# Patient Record
Sex: FEMALE | Race: WHITE | Employment: FULL TIME | URBAN - METROPOLITAN AREA
[De-identification: names, ages, dates, MRNs, and addresses within clinical notes are randomized per-mention and may not be internally consistent; named-entity substitution may affect disease eponyms.]

---

## 2017-01-28 RX ORDER — SERTRALINE HYDROCHLORIDE 25 MG/1
TABLET, FILM COATED ORAL
Qty: 30 TAB | Refills: 2 | Status: SHIPPED | OUTPATIENT
Start: 2017-01-28 | End: 2022-05-09

## 2017-03-13 ENCOUNTER — OFFICE VISIT (OUTPATIENT)
Dept: MIDWIFE SERVICES | Age: 47
End: 2017-03-13

## 2017-03-13 VITALS
HEART RATE: 64 BPM | TEMPERATURE: 97.4 F | BODY MASS INDEX: 21.33 KG/M2 | SYSTOLIC BLOOD PRESSURE: 103 MMHG | HEIGHT: 65 IN | DIASTOLIC BLOOD PRESSURE: 62 MMHG | WEIGHT: 128 LBS

## 2017-03-13 DIAGNOSIS — N89.8 VAGINAL DISCHARGE: Primary | ICD-10-CM

## 2017-03-13 LAB
BILIRUB UR QL STRIP: NEGATIVE
GLUCOSE UR-MCNC: NEGATIVE MG/DL
KETONES P FAST UR STRIP-MCNC: NEGATIVE MG/DL
PH UR STRIP: 7 [PH] (ref 4.6–8)
PROT UR QL STRIP: NEGATIVE MG/DL
SP GR UR STRIP: 1.02 (ref 1–1.03)
UA UROBILINOGEN AMB POC: NORMAL (ref 0.2–1)
URINALYSIS CLARITY POC: CLEAR
URINALYSIS COLOR POC: YELLOW
URINE BLOOD POC: NORMAL
URINE LEUKOCYTES POC: NORMAL
URINE NITRITES POC: NEGATIVE

## 2017-03-13 RX ORDER — NITROFURANTOIN 25; 75 MG/1; MG/1
CAPSULE ORAL
Refills: 0 | COMMUNITY
Start: 2016-12-09 | End: 2017-03-13 | Stop reason: ALTCHOICE

## 2017-03-13 RX ORDER — OSELTAMIVIR PHOSPHATE 75 MG/1
CAPSULE ORAL
Refills: 0 | COMMUNITY
Start: 2017-02-06 | End: 2017-03-13 | Stop reason: ALTCHOICE

## 2017-03-13 NOTE — PROGRESS NOTES
Chief Complaint   Patient presents with    Well Woman     Possible bacterial infection     Visit Vitals    /62    Pulse 64    Temp 97.4 °F (36.3 °C) (Oral)    Ht 5' 5\" (1.651 m)    Wt 128 lb (58.1 kg)    Breastfeeding No    BMI 21.3 kg/m2     1. Have you been to the ER, urgent care clinic since your last visit? Hospitalized since your last visit? No    2. Have you seen or consulted any other health care providers outside of the [address] [place] [address] since your last visit? Include any pap smears or colon screening. No     Patient here for possible bacterial infection. Verbal order per Dr Edenilson Shields for Urine dip and Nuswab.

## 2017-03-13 NOTE — PROGRESS NOTES
GYN Visit Note          Chief Complaint: vaginal discharge    HPI:    Gilbert Michaud  51 y.o. WF     LMP:  presents after visit with PCP who treated her for UTI with Macrobid (medication only record in chart) now had thick yellow vaginal discharge with odor. She denies fever/chill/nausea/vomiting/cough or painful urination. Review of Systems: -    General ROS: negative for - chills, fever or malaise  Gastrointestinal ROS: no abdominal pain, change in bowel habits, or black or bloody stools  Genito-Urinary ROS: no dysuria, trouble voiding, or hematuria    OBJECTIVE:  Blood pressure 103/62, pulse 64, temperature 97.4 °F (36.3 °C), temperature source Oral, height 5' 5\" (1.651 m), weight 128 lb (58.1 kg), not currently breastfeeding. General appearance - alert, well appearing, and in no distress  Mental status - alert, oriented to person, place, and time  Abdomen - soft, nontender, nondistended, no masses or organomegaly  Pelvic - VULVA: normal appearing vulva with no masses, tenderness or lesions, VAGINA: normal appearing vagina with normal color and discharge, no lesions, CERVIX: cervical discharge present - creamy at the cervix, Nuswab obtained and sent for identification. Chaperoned by Jem Martinez RN     ASSESSMENT / PLAN:    Tahir Mathias was seen today for well woman. Diagnoses and all orders for this visit:    Vaginal discharge  -     AMB POC URINALYSIS DIP STICK MANUAL W/O MICRO  -     NUSWAB VAGINITIS      Follow-up Disposition:  Return if symptoms worsen or fail to improve. Tray Su MD, [address] [place] [address], Retired    Liliane Prather Professor, [address] Medicine

## 2017-03-16 LAB
A VAGINAE DNA VAG QL NAA+PROBE: NORMAL SCORE
BVAB2 DNA VAG QL NAA+PROBE: NORMAL SCORE
C ALBICANS DNA VAG QL NAA+PROBE: NEGATIVE
C GLABRATA DNA VAG QL NAA+PROBE: NEGATIVE
MEGA1 DNA VAG QL NAA+PROBE: NORMAL SCORE
T VAGINALIS RRNA SPEC QL NAA+PROBE: NEGATIVE

## 2017-09-14 ENCOUNTER — OFFICE VISIT (OUTPATIENT)
Dept: MIDWIFE SERVICES | Age: 47
End: 2017-09-14

## 2017-09-14 VITALS
HEIGHT: 65 IN | DIASTOLIC BLOOD PRESSURE: 60 MMHG | WEIGHT: 125 LBS | BODY MASS INDEX: 20.83 KG/M2 | SYSTOLIC BLOOD PRESSURE: 94 MMHG | HEART RATE: 57 BPM

## 2017-09-14 DIAGNOSIS — Z01.419 WELL WOMAN EXAM: Primary | ICD-10-CM

## 2017-09-14 DIAGNOSIS — G43.109 MIGRAINE WITH AURA AND WITHOUT STATUS MIGRAINOSUS, NOT INTRACTABLE: ICD-10-CM

## 2017-09-14 RX ORDER — ALPRAZOLAM 0.25 MG/1
TABLET ORAL
COMMUNITY

## 2017-09-14 RX ORDER — CETIRIZINE HCL 10 MG
10 TABLET ORAL
COMMUNITY
End: 2022-05-09

## 2017-09-14 NOTE — PROGRESS NOTES
I was present with the resident during the interview & examination of the patient. I personally interviewed the patient and repeated the critical or key portions of the exam.  I confirmed/amended the history, exam, assessment and plan as noted. Diagnoses and all orders for this visit:    1. Well woman exam    2. Migraine with aura and without status migrainosus, not intractable      Follow-up Disposition:  Return in about 1 year (around 9/14/2018) for Annual exam - offered CNM for continued care and she indicated she new Lucy Monsalve and would follow up with her. Tray Avery MD, [address] [place] [address], Retired    Marisel Arteaga Professor, [address]

## 2017-09-14 NOTE — PROGRESS NOTES
Well Woman Check Up       Subjective:     49 y.o. CaucasianF  G3 [id number] LMP: 9/1/2017 for routine annual exam    LMP: 9/1/17. GYN: Has been having heavier periods than before-possibly starting menopause. OB: Pt considering having another child either through IVF or surrogate. Has not made a decision yet. Her previous pregnancy was via IVF and pregnancy history complicated by stillbirth, pre-eclampsia and iso-immunization. Social History: sexually active with , not using contraception     Pertinent past medical history: no history of HTN, DVT, CAD, DM, liver disease, or smoking. She reports having migraines with auras for several years now-occur once a month. PCP aware but pt states they were never bad enough to want her to start medication. PAP History:   No history of abnormal pap smears. Last pap from 2015 negative. Colonoscopy: To be done at age 47     Mammogram:  Last mammogram from 2015 normal. Has had a total of 5 normal mammograms. Will repeat mammogram at age 47. DEXA: Not indicated at this time. 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  cetirizine (ZYRTEC) 10 mg tablet Take 10 mg by mouth.       sertraline (ZOLOFT) 25 mg tablet TAKE 1 TABLET BY MOUTH DAILY. 30 Tab 2    valACYclovir (VALTREX) 500 mg tablet Take 1 Tab by mouth daily. 30 Tab 0    loratadine (CLARITIN) 10 mg tablet Take 10 mg by mouth. Allergies   Allergen Reactions    Flagyl [Metronidazole] Hives     Past Medical History:   Diagnosis Date    AR (allergic rhinitis)     Genital herpes     x- has, not patient    Infertility, female     unexplained    Preeclampsia 7/2015    Severe; with fetal demise at 33 weeks. Past Surgical History:   Procedure Laterality Date    HX APPENDECTOMY  1/2010    HX OTHER SURGICAL  1990    Exp Lap     Family History   Problem Relation Age of Onset    Osteoporosis Mother     Heart Disease Mother     Cancer Mother     Diabetes Paternal Aunt     High Cholesterol Sister     Hypertension Sister     Uterine Cancer Neg Hx     Colon Cancer Neg Hx     Ovarian Cancer Neg Hx      Social History   Substance Use Topics    Smoking status: Never Smoker    Smokeless tobacco: Never Used    Alcohol use 0.0 oz/week      Comment: two drinks a week per patient        ROS:  Feeling well. No dyspnea or chest pain on exertion. No abdominal pain, change in bowel habits, black or bloody stools. No urinary tract symptoms. GYN ROS: normal menses, no abnormal bleeding, pelvic pain or discharge, no breast pain or new or enlarging lumps on self exam. No neurological complaints. Objective:     Visit Vitals    BP 94/60    Pulse (!) 57    Ht 5' 5\" (1.651 m)    Wt 125 lb (56.7 kg)    LMP 09/01/2017 (Approximate)    Breastfeeding No    BMI 20.8 kg/m2     Gen: The patient appears well, alert, oriented x 3, in no distress. ENT:  normal.   Neck: supple. No adenopathy or thyromegaly. VIRGILIO. Lungs:  clear, good air entry, no wheezes, rhonchi or rales. CV: S1 and S2 normal, no murmurs, regular rate and rhythm. Abdomen: soft without tenderness, guarding, mass or organomegaly. Extremities:  no edema, normal peripheral pulses.    Neurological:normal, no focal findings. BREAST EXAM: Examined upright and supine. Benign symmetrical breasts with normal appearing nipples. No masses, no nodes. No nipple retraction or discharge. No skin retraction or subclavicular or axillary lymphadenopathy.     PELVIC EXAM: (performed by Dr. Luis Gandara) VULVA: normal appearing vulva with no masses, tenderness or lesions, VAGINA: normal appearing vagina with normal color and discharge, no lesions, CERVIX: normal appearing cervix without discharge or lesions, UTERUS: uterus is normal size, shape, consistency and nontender, ADNEXA: normal adnexa in size, nontender and no masses exam chaperoned by:  Travis Toscano    Assessment/Plan:     48 yo female presents for annual wellness exam. Doing well.   -Discussed healthy lifestyle with diet and exercise  -No pap done today as last one 2 years ago was normal-to be done at next annual visit   -Possibly starting menopause - provided precautions regarding menopausal symptoms  -Migraines with auras: pt to discuss with PCP regarding starting medication   -Follow-up in 1 year for annual wellness exam     Pt seen and discussed with Dr. Richrd Olszewski, MD  09/14/17

## 2017-09-14 NOTE — PROGRESS NOTES
Chief Complaint   Patient presents with    Well Woman     Visit Vitals    BP 94/60    Pulse (!) 57    Ht 5' 5\" (1.651 m)    Wt 125 lb (56.7 kg)    Breastfeeding No    BMI 20.8 kg/m2     1. Have you been to the ER, urgent care clinic since your last visit? Hospitalized since your last visit? No    2. Have you seen or consulted any other health care providers outside of the [address] [place] [address] since your last visit? Include any pap smears or colon screening.  No     Here today for well woman

## 2018-07-31 ENCOUNTER — OFFICE VISIT (OUTPATIENT)
Dept: OBGYN CLINIC | Age: 48
End: 2018-07-31

## 2018-07-31 VITALS
SYSTOLIC BLOOD PRESSURE: 100 MMHG | WEIGHT: 122 LBS | BODY MASS INDEX: 20.33 KG/M2 | DIASTOLIC BLOOD PRESSURE: 60 MMHG | HEIGHT: 65 IN

## 2018-07-31 DIAGNOSIS — N93.8 DUB (DYSFUNCTIONAL UTERINE BLEEDING): Primary | ICD-10-CM

## 2018-07-31 NOTE — PROGRESS NOTES
Abnormal bleeding note      Anna Amado is a 51 y.o. female who complains of vaginal bleeding problems. She states she had 2 menses in July. Her current method of family planning is none. She believes she might be perimenopausal due to having irregular menses and insomnia. She developed this problem approximately a few months ago. Two periods in July. Had donor egg IVF X 2. First was son--stillborn at 7 months. Second was live, healthy female. May consider another pregnancy--still has one embryo frozen. Could possibly use surrogate. Did not tolerate OCP in the past.    She has had vaginal bleeding which she describes as heavy lasting up to 7 days. Pad or tampon count: changes every 3 hours. Associated symptoms include insomnia. She is currently taking Magnesium at night to help with sleep.  is chiropracter. Alleviating factors: none    Aggravating factors: none      The patient is sexually active. Last Pap smear:was normal 3 years ago. Her relevant past medical history:   Past Medical History:   Diagnosis Date    AR (allergic rhinitis)     Genital herpes     x- has, not patient    Infertility, female     unexplained    Preeclampsia 7/2015    Severe; with fetal demise at 33 weeks.         Past Surgical History:   Procedure Laterality Date    HX APPENDECTOMY  1/2010    HX OTHER SURGICAL  1990    Exp Lap     Social History     Occupational History    healthcare manager      Social History Main Topics    Smoking status: Never Smoker    Smokeless tobacco: Never Used    Alcohol use 0.0 oz/week      Comment: two drinks a week per patient    Drug use: No    Sexual activity: Yes     Partners: Male     Birth control/ protection: None     Family History   Problem Relation Age of Onset    Osteoporosis Mother     Heart Disease Mother     Cancer Mother     Diabetes Paternal [de-identified]     High Cholesterol Sister     Hypertension Sister     Uterine Cancer Neg Hx  Colon Cancer Neg Hx     Ovarian Cancer Neg Hx        Allergies   Allergen Reactions    Flagyl [Metronidazole] Hives     Prior to Admission medications    Medication Sig Start Date End Date Taking? Authorizing Provider   ALPRAZolam Sarah Allis) 0.25 mg tablet Take  by mouth. Historical Provider   cetirizine (ZYRTEC) 10 mg tablet Take 10 mg by mouth. Historical Provider   sertraline (ZOLOFT) 25 mg tablet TAKE 1 TABLET BY MOUTH DAILY. 1/28/17   Taj Espinoza MD   valACYclovir (VALTREX) 500 mg tablet Take 1 Tab by mouth daily. 9/23/16   Shiv Asencio MD   loratadine (CLARITIN) 10 mg tablet Take 10 mg by mouth.     Historical Provider        Review of Systems - History obtained from the patient  Constitutional: negative for weight loss, fever, night sweats  HEENT: negative for hearing loss, earache, congestion, snoring, sorethroat  CV: negative for chest pain, palpitations, edema  Resp: negative for cough, shortness of breath, wheezing  Breast: negative for breast lumps, nipple discharge, galactorrhea  GI: negative for change in bowel habits, abdominal pain, black or bloody stools  : negative for frequency, dysuria, hematuria  MSK: negative for back pain, joint pain, muscle pain  Skin: negative for itching, rash, hives  Neuro: negative for dizziness, headache, confusion, weakness  Psych: negative for anxiety, depression, change in mood  Heme/lymph: negative for bleeding, bruising, pallor      Objective:    Visit Vitals    /60    Ht 5' 5\" (1.651 m)    Wt 122 lb (55.3 kg)    LMP 07/21/2018 (Exact Date)    BMI 20.3 kg/m2          PHYSICAL EXAMINATION    Constitutional  · Appearance: well-nourished, well developed, alert, in no acute distress    HENT  · Head and Face: appears normal    Skin  · General Inspection: no rash, no lesions identified    Neurologic/Psychiatric  · Mental Status:  · Orientation: grossly oriented to person, place and time  · Mood and Affect: mood normal, affect appropriate    Assessment:   Presumed perimenopause bleeding  Not a candidate for OCP. Not a great candidate for ablation. Suggested consider Mirena    Plan:   Rx Mirena prn. Discussed surrogacy in 2018. RTO prn. I spent 30 minutes with the patient, more than half of which was face to face counseling. Instructions given to pt. Handouts given to pt.

## 2019-01-04 ENCOUNTER — OFFICE VISIT (OUTPATIENT)
Dept: OBGYN CLINIC | Age: 49
End: 2019-01-04

## 2019-01-04 DIAGNOSIS — Z01.419 ENCOUNTER FOR GYNECOLOGICAL EXAMINATION: Primary | ICD-10-CM

## 2019-01-25 ENCOUNTER — OFFICE VISIT (OUTPATIENT)
Dept: OBGYN CLINIC | Age: 49
End: 2019-01-25

## 2019-01-25 VITALS
SYSTOLIC BLOOD PRESSURE: 100 MMHG | WEIGHT: 122 LBS | BODY MASS INDEX: 20.33 KG/M2 | HEIGHT: 65 IN | DIASTOLIC BLOOD PRESSURE: 60 MMHG

## 2019-01-25 DIAGNOSIS — Z01.419 ENCOUNTER FOR GYNECOLOGICAL EXAMINATION (GENERAL) (ROUTINE) WITHOUT ABNORMAL FINDINGS: Primary | ICD-10-CM

## 2019-01-25 RX ORDER — MELATONIN
DAILY
COMMUNITY

## 2019-01-25 NOTE — PROGRESS NOTES
Annual exam ages 40-58    [address] is a ,  51 y.o. female [facility] Patient's last menstrual period was 2019 (approximate). .    She presents for her annual checkup. She is having no significant problems. Menses better now. With regard to the Gardasil vaccine, she is older than the age for which it is FDA approved. Menstrual status:    Her periods are light, moderate in flow. She is using one to two pads or tampons per day, usually regular and last 26-30 days. She denies dysmenorrhea. She reports no premenstrual symptoms. Contraception:    The current method of family planning is none. Sexual history:    She  reports that she currently engages in sexual activity and has had partners who are Male. She reports using the following method of birth control/protection: None. Medical conditions:    Since her last annual GYN exam about one year ago, she has not the following changes in her health history: none. Pap and Mammogram History:    Her most recent Pap smear was normal, obtained 3 year(s) ago. The patient has not had a recent mammogram.    Breast Cancer History/Substance Abuse: negative    Osteoporosis History:    Family history does not include a first or second degree relative with osteopenia or osteoporosis. A bone density scan has not been obtained. She is currently taking  vit D. Past Medical History:   Diagnosis Date    AR (allergic rhinitis)     Genital herpes     x- has, not patient    Infertility, female     unexplained    Preeclampsia 2015    Severe; with fetal demise at 33 weeks. Past Surgical History:   Procedure Laterality Date    HX APPENDECTOMY  2010    HX OTHER SURGICAL      Exp        Current Outpatient Medications   Medication Sig Dispense Refill    cholecalciferol (VITAMIN D3) 1,000 unit tablet Take  by mouth daily.  cetirizine (ZYRTEC) 10 mg tablet Take 10 mg by mouth.       ALPRAZolam (XANAX) 0.25 mg tablet Take  by mouth.  sertraline (ZOLOFT) 25 mg tablet TAKE 1 TABLET BY MOUTH DAILY. 30 Tab 2    valACYclovir (VALTREX) 500 mg tablet Take 1 Tab by mouth daily. 30 Tab 0    loratadine (CLARITIN) 10 mg tablet Take 10 mg by mouth. Allergies: Flagyl [metronidazole]     Tobacco History:  reports that  has never smoked. she has never used smokeless tobacco.  Alcohol Abuse:  reports that she drinks alcohol. Drug Abuse:  reports that she does not use drugs.     Family Medical/Cancer History:   Family History   Problem Relation Age of Onset    Osteoporosis Mother     Heart Disease Mother     Cancer Mother     Diabetes Paternal [de-identified]     High Cholesterol Sister     Hypertension Sister     Uterine Cancer Neg Hx     Colon Cancer Neg Hx     Ovarian Cancer Neg Hx         Review of Systems - History obtained from the patient  Constitutional: negative for weight loss, fever, night sweats  HEENT: negative for hearing loss, earache, congestion, snoring, sorethroat  CV: negative for chest pain, palpitations, edema  Resp: negative for cough, shortness of breath, wheezing  GI: negative for change in bowel habits, abdominal pain, black or bloody stools  : negative for frequency, dysuria, hematuria, vaginal discharge  MSK: negative for back pain, joint pain, muscle pain  Breast: negative for breast lumps, nipple discharge, galactorrhea  Skin :negative for itching, rash, hives  Neuro: negative for dizziness, headache, confusion, weakness  Psych: negative for anxiety, depression, change in mood  Heme/lymph: negative for bleeding, bruising, pallor    Physical Exam    Visit Vitals  /60   Ht 5' 5\" (1.651 m)   Wt 122 lb (55.3 kg)   LMP 01/06/2019 (Approximate)   BMI 20.30 kg/m²       Constitutional  · Appearance: well-nourished, well developed, alert, in no acute distress    HENT  · Head and Face: appears normal    Neck  · Inspection/Palpation: normal appearance, no masses or tenderness  · Lymph Nodes: no lymphadenopathy present  · Thyroid: gland size normal, nontender, no nodules or masses present on palpation    Chest  · Respiratory Effort: breathing unlabored  · Auscultation: normal breath sounds    Cardiovascular  · Heart:  · Auscultation: regular rate and rhythm without murmur    Breasts  · Inspection of Breasts: breasts symmetrical, no skin changes, no discharge present, nipple appearance normal, no skin retraction present  · Palpation of Breasts and Axillae: no masses present on palpation, no breast tenderness  · Axillary Lymph Nodes: no lymphadenopathy present    Gastrointestinal  · Abdominal Examination: abdomen non-tender to palpation, normal bowel sounds, no masses present  · Liver and spleen: no hepatomegaly present, spleen not palpable  · Hernias: no hernias identified    Genitourinary  · External Genitalia: normal appearance for age, no discharge present, no tenderness present, no inflammatory lesions present, no masses present, no atrophy present  · Vagina: normal vaginal vault without central or paravaginal defects, no discharge present, no inflammatory lesions present, no masses present  · Bladder: non-tender to palpation  · Urethra: appears normal  · Cervix: normal   · Uterus: normal size, shape and consistency  · Adnexa: no adnexal tenderness present, no adnexal masses present  · Perineum: perineum within normal limits, no evidence of trauma, no rashes or skin lesions present  · Anus: anus within normal limits, no hemorrhoids present  · Inguinal Lymph Nodes: no lymphadenopathy present    Skin  · General Inspection: no rash, no lesions identified    Neurologic/Psychiatric  · Mental Status:  · Orientation: grossly oriented to person, place and time  · Mood and Affect: mood normal, affect appropriate    Assessment:  Routine gynecologic examination  Her current medical status is satisfactory with no evidence of significant gynecologic issues.     Plan:  Counseled re: diet, exercise, healthy lifestyle  Return for yearly wellness visits  Rec annual mammogram

## 2019-01-25 NOTE — PATIENT INSTRUCTIONS

## 2019-01-31 LAB
CYTOLOGIST CVX/VAG CYTO: NORMAL
CYTOLOGY CVX/VAG DOC THIN PREP: NORMAL
CYTOLOGY HISTORY:: NORMAL
DX ICD CODE: NORMAL
HPV I/H RISK 1 DNA CVX QL PROBE+SIG AMP: NEGATIVE
Lab: NORMAL
Lab: NORMAL
OTHER STN SPEC: NORMAL
PATH REPORT.FINAL DX SPEC: NORMAL
STAT OF ADQ CVX/VAG CYTO-IMP: NORMAL

## 2019-03-08 ENCOUNTER — APPOINTMENT (OUTPATIENT)
Dept: OBGYN CLINIC | Age: 49
End: 2019-03-08

## 2020-07-09 NOTE — PROGRESS NOTES
Annual exam ages 40-58      [address] is a ,  45 y.o. female   Patient's last menstrual period was 2020 (exact date). She presents for her annual checkup. She is having hormonal migraines-she is currently taking Magesnium to help with that. Menses are fairly regular every other month. Has a third embryo in storage with [facility] (in [place]). Considering IVF with that. First pregnancy stillborn with severe PIH. With regard to the Gardasil vaccine, she is older than the FDA approved age to receive it. Menstrual status:    Her periods are normal in flow. She is using three to ten pads or tampons per day, usually occuring every other month. She does not have dysmenorrhea. She reports no premenstrual symptoms. Contraception:    The current method of family planning is none. Hormonal status:  She reports no perimenstrual type symptoms. She is not having vasomotor symptoms. The patient is not using any ERT. Sexual history:    She  reports being sexually active and has had partner(s) who are Male. She reports using the following method of birth control/protection: None. Medical conditions:    Since her last annual GYN exam about one year ago, she has not the following changes in her health history: none. Surgical history confirmed with patient. has a past surgical history that includes hx appendectomy (2010) and hx other surgical (). Pap and Mammogram History:    Her most recent Pap smear was normal, obtained 1 year(s) ago. The patient had her mammogram today in our office. Breast Cancer History/Substance Abuse: negative      Osteoporosis History:    Family history does include a first or second degree relative with osteopenia or osteoporosis.     A bone density scan has not been obtained     Past Medical History:   Diagnosis Date    AR (allergic rhinitis)     Genital herpes     x- has, not patient    Infertility, female unexplained    Preeclampsia 7/2015    Severe; with fetal demise at 33 weeks. Past Surgical History:   Procedure Laterality Date    HX APPENDECTOMY  1/2010    HX OTHER SURGICAL  1990    Exp Lap       Current Outpatient Medications   Medication Sig Dispense Refill    magnesium 250 mg tab Take  by mouth.  cetirizine (ZYRTEC) 10 mg tablet Take 10 mg by mouth.  cholecalciferol (VITAMIN D3) 1,000 unit tablet Take  by mouth daily.  ALPRAZolam (XANAX) 0.25 mg tablet Take  by mouth.  sertraline (ZOLOFT) 25 mg tablet TAKE 1 TABLET BY MOUTH DAILY. 30 Tab 2    valACYclovir (VALTREX) 500 mg tablet Take 1 Tab by mouth daily. 30 Tab 0    loratadine (CLARITIN) 10 mg tablet Take 10 mg by mouth. Allergies: Flagyl [metronidazole]     Tobacco History:  reports that she has never smoked. She has never used smokeless tobacco.  Alcohol Abuse:  reports current alcohol use. Drug Abuse:  reports no history of drug use.     Family Medical/Cancer History:   Family History   Problem Relation Age of Onset    Osteoporosis Mother     Heart Disease Mother     Cancer Mother     Diabetes Paternal [de-identified]     High Cholesterol Sister     Hypertension Sister     Uterine Cancer Neg Hx     Colon Cancer Neg Hx     Ovarian Cancer Neg Hx         Review of Systems - History obtained from the patient  Constitutional: negative for weight loss, fever, night sweats  HEENT: negative for hearing loss, earache, congestion, snoring, sorethroat  CV: negative for chest pain, palpitations, edema  Resp: negative for cough, shortness of breath, wheezing  GI: negative for change in bowel habits, abdominal pain, black or bloody stools  : negative for frequency, dysuria, hematuria, vaginal discharge  MSK: negative for back pain, joint pain, muscle pain  Breast: negative for breast lumps, nipple discharge, galactorrhea  Skin :negative for itching, rash, hives  Neuro: negative for dizziness, headache, confusion, weakness  Psych: negative for anxiety, depression, change in mood  Heme/lymph: negative for bleeding, bruising, pallor    Physical Exam    Visit Vitals  /64   Ht 5' 5\" (1.651 m)   Wt 120 lb (54.4 kg)   LMP 06/11/2020 (Exact Date)   Breastfeeding No   BMI 19.97 kg/m²       Constitutional  · Appearance: well-nourished, well developed, alert, in no acute distress    HENT  · Head and Face: appears normal    Neck  · Inspection/Palpation: normal appearance, no masses or tenderness  · Lymph Nodes: no lymphadenopathy present  · Thyroid: gland size normal, nontender, no nodules or masses present on palpation    Chest  · Respiratory Effort: breathing unlabored  · Auscultation: normal breath sounds    Cardiovascular  · Heart:  · Auscultation: regular rate and rhythm without murmur    Breasts  · Inspection of Breasts: breasts symmetrical, no skin changes, no discharge present, nipple appearance normal, no skin retraction present  · Palpation of Breasts and Axillae: no masses present on palpation, no breast tenderness  · Axillary Lymph Nodes: no lymphadenopathy present    Gastrointestinal  · Abdominal Examination: abdomen non-tender to palpation, normal bowel sounds, no masses present  · Liver and spleen: no hepatomegaly present, spleen not palpable  · Hernias: no hernias identified    Genitourinary  · External Genitalia: normal appearance for age, no discharge present, no tenderness present, no inflammatory lesions present, no masses present, no atrophy present  · Vagina: normal vaginal vault without central or paravaginal defects, no discharge present, no inflammatory lesions present, no masses present  · Bladder: non-tender to palpation  · Urethra: appears normal  · Cervix: normal   · Uterus: normal size, shape and consistency  · Adnexa: no adnexal tenderness present, no adnexal masses present  · Perineum: perineum within normal limits, no evidence of trauma, no rashes or skin lesions present  · Anus: anus within normal limits, no hemorrhoids present  · Inguinal Lymph Nodes: no lymphadenopathy present    Skin  · General Inspection: no rash, no lesions identified    Neurologic/Psychiatric  · Mental Status:  · Orientation: grossly oriented to person, place and time  · Mood and Affect: mood normal, affect appropriate    Assessment:  Routine gynecologic examination  Her current medical status is satisfactory with no evidence of significant gynecologic issues. Discussed degree of risk with donor egg/sperm same genetic parents as first (stillborn) and second (term, healthy) children.     Plan:  Counseled re: diet, exercise, healthy lifestyle  Return for yearly wellness visits  Rec annual mammogram

## 2020-07-15 ENCOUNTER — OFFICE VISIT (OUTPATIENT)
Dept: OBGYN CLINIC | Age: 50
End: 2020-07-15

## 2020-07-15 VITALS
HEIGHT: 65 IN | DIASTOLIC BLOOD PRESSURE: 64 MMHG | WEIGHT: 120 LBS | SYSTOLIC BLOOD PRESSURE: 110 MMHG | BODY MASS INDEX: 19.99 KG/M2

## 2020-07-15 DIAGNOSIS — Z01.419 ENCOUNTER FOR GYNECOLOGICAL EXAMINATION (GENERAL) (ROUTINE) WITHOUT ABNORMAL FINDINGS: Primary | ICD-10-CM

## 2020-07-15 RX ORDER — ZINC GLUCONATE 10 MG
LOZENGE ORAL
COMMUNITY
End: 2022-05-09

## 2020-07-15 NOTE — PATIENT INSTRUCTIONS
Well Visit, Women 48 to 72: Care Instructions  Your Care Instructions     Physical exams can help you stay healthy. Your doctor has checked your overall health and may have suggested ways to take good care of yourself. He or she also may have recommended tests. At home, you can help prevent illness with healthy eating, regular exercise, and other steps. Follow-up care is a key part of your treatment and safety. Be sure to make and go to all appointments, and call your doctor if you are having problems. It's also a good idea to know your test results and keep a list of the medicines you take. How can you care for yourself at home? · Reach and stay at a healthy weight. This will lower your risk for many problems, such as obesity, diabetes, heart disease, and high blood pressure. · Get at least 30 minutes of exercise on most days of the week. Walking is a good choice. You also may want to do other activities, such as running, swimming, cycling, or playing tennis or team sports. · Do not smoke. Smoking can make health problems worse. If you need help quitting, talk to your doctor about stop-smoking programs and medicines. These can increase your chances of quitting for good. · Protect your skin from too much sun. When you're outdoors from 10 a.m. to 4 p.m., stay in the shade or cover up with clothing and a hat with a wide brim. Wear sunglasses that block UV rays. Even when it's cloudy, put broad-spectrum sunscreen (SPF 30 or higher) on any exposed skin. · See a dentist one or two times a year for checkups and to have your teeth cleaned. · Wear a seat belt in the car. Follow your doctor's advice about when to have certain tests. These tests can spot problems early. · Cholesterol. Your doctor will tell you how often to have this done based on your age, family history, or other things that can increase your risk for heart attack and stroke. · Blood pressure.  Have your blood pressure checked during a routine doctor visit. Your doctor will tell you how often to check your blood pressure based on your age, your blood pressure results, and other factors. · Mammogram. Ask your doctor how often you should have a mammogram, which is an X-ray of your breasts. A mammogram can spot breast cancer before it can be felt and when it is easiest to treat. · Pap test and pelvic exam. Ask your doctor how often you should have a Pap test. You may not need to have a Pap test as often as you used to. · Vision. Have your eyes checked every year or two or as often as your doctor suggests. Some experts recommend that you have yearly exams for glaucoma and other age-related eye problems starting at age 48. · Hearing. Tell your doctor if you notice any change in your hearing. You can have tests to find out how well you hear. · Diabetes. Ask your doctor whether you should have tests for diabetes. · Colorectal cancer. Your risk for colorectal cancer gets higher as you get older. Some experts say that adults should start regular screening at age 48 and stop at age 76. Others say to start before age 48 or continue after age 76. Talk with your doctor about your risk and when to start and stop screening. · Thyroid disease. Talk to your doctor about whether to have your thyroid checked as part of a regular physical exam. Women have an increased chance of a thyroid problem. · Osteoporosis. You should begin tests for bone density at age 72. If you are younger than 72, ask your doctor whether you have factors that may increase your risk for this disease. You may want to have this test before age 72. · Heart attack and stroke risk. At least every 4 to 6 years, you should have your risk for heart attack and stroke assessed. Your doctor uses factors such as your age, blood pressure, cholesterol, and whether you smoke or have diabetes to show what your risk for a heart attack or stroke is over the next 10 years.   When should you call for help?  Watch closely for changes in your health, and be sure to contact your doctor if you have any problems or symptoms that concern you. Where can you learn more? Go to http://www.gray.com/  Enter [id number] in the search box to learn more about \"Well Visit, Women 50 to 72: Care Instructions. \"  Current as of: August 22, 2019               Content Version: 12.5  © 2404-8959 [employer], [employer]. Care instructions adapted under license by [employer] (which disclaims liability or warranty for this information). If you have questions about a medical condition or this instruction, always ask your healthcare professional. [address] any warranty or liability for your use of this information.

## 2022-05-03 ENCOUNTER — TELEPHONE (OUTPATIENT)
Dept: OBGYN CLINIC | Age: 52
End: 2022-05-03

## 2022-05-03 NOTE — TELEPHONE ENCOUNTER
46 yo  AE 7/15/2020    Pt phoned has had 3 cycles in the past 5 weeks. Moderate to heavy first then tapers off with some mild cramping. Last 3 - 4 days. Please advise.

## 2022-05-03 NOTE — TELEPHONE ENCOUNTER
Phoned pt advised of MD recommendation. Appt made for 5/9/2022 2:30pm arrive 2:15pm.  Pt does not have health insurance will pay $100 and be billed for remaining balance.

## 2022-05-09 ENCOUNTER — OFFICE VISIT (OUTPATIENT)
Dept: OBGYN CLINIC | Age: 52
End: 2022-05-09

## 2022-05-09 VITALS — SYSTOLIC BLOOD PRESSURE: 108 MMHG | WEIGHT: 126 LBS | DIASTOLIC BLOOD PRESSURE: 60 MMHG | BODY MASS INDEX: 20.97 KG/M2

## 2022-05-09 DIAGNOSIS — N95.0 POSTMENOPAUSAL BLEEDING: ICD-10-CM

## 2022-05-09 DIAGNOSIS — N60.12 BILATERAL FIBROCYSTIC BREAST DISEASE (FCBD): ICD-10-CM

## 2022-05-09 DIAGNOSIS — N84.1 CERVICAL POLYP: ICD-10-CM

## 2022-05-09 DIAGNOSIS — Z01.419 ENCOUNTER FOR GYNECOLOGICAL EXAMINATION (GENERAL) (ROUTINE) WITHOUT ABNORMAL FINDINGS: Primary | ICD-10-CM

## 2022-05-09 DIAGNOSIS — N60.11 BILATERAL FIBROCYSTIC BREAST DISEASE (FCBD): ICD-10-CM

## 2022-05-09 PROCEDURE — 99396 PREV VISIT EST AGE 40-64: CPT | Performed by: OBSTETRICS & GYNECOLOGY

## 2022-05-09 NOTE — PROGRESS NOTES
Annual exam ages 40-58      [address] is a ,  45 y.o. female   Patient's last menstrual period was 2022. She presents for her annual checkup. She is having see additional note. With regard to the Gardasil vaccine, she is older than the FDA approved age to receive it. Menstrual status:    Her periods are normal in flow. She is using three to ten pads or tampons per day, often irregular with no apparent pattern. She does not have dysmenorrhea. She reports no premenstrual symptoms. Contraception:    The current method of family planning is none. Hormonal status:  She reports no perimenstrual type symptoms. She is not having vasomotor symptoms. The patient is not using any ERT. Sexual history:    She  reports being sexually active and has had partner(s) who are male. She reports using the following method of birth control/protection: None. Medical conditions:    Since her last annual GYN exam about one year ago, she has not the following changes in her health history: none. Surgical history confirmed with patient. has a past surgical history that includes hx appendectomy (2010) and hx other surgical (). Pap and Mammogram History:    Her most recent Pap smear was normal, obtained 3 year(s) ago. The patient has not had a recent mammogram.    Breast Cancer History/Substance Abuse: negative      Osteoporosis History:    Family history does not include a first or second degree relative with osteopenia or osteoporosis. Past Medical History:   Diagnosis Date    AR (allergic rhinitis)     Genital herpes     x- has, not patient    Infertility, female     unexplained    Preeclampsia 2015    Severe; with fetal demise at 33 weeks.      Past Surgical History:   Procedure Laterality Date    HX APPENDECTOMY  2010    HX OTHER SURGICAL      Exp Lap       Current Outpatient Medications   Medication Sig Dispense Refill    magnesium 250 mg tab Take  by mouth.  cholecalciferol (VITAMIN D3) 1,000 unit tablet Take  by mouth daily.  ALPRAZolam (XANAX) 0.25 mg tablet Take  by mouth.  cetirizine (ZYRTEC) 10 mg tablet Take 10 mg by mouth.  sertraline (ZOLOFT) 25 mg tablet TAKE 1 TABLET BY MOUTH DAILY. 30 Tab 2    valACYclovir (VALTREX) 500 mg tablet Take 1 Tab by mouth daily. 30 Tab 0    loratadine (CLARITIN) 10 mg tablet Take 10 mg by mouth. Allergies: Flagyl [metronidazole]     Tobacco History:  reports that she has never smoked. She has never used smokeless tobacco.  Alcohol Abuse:  reports current alcohol use. Drug Abuse:  reports no history of drug use.     Family Medical/Cancer History:   Family History   Problem Relation Age of Onset    Osteoporosis Mother     Heart Disease Mother     Cancer Mother     Diabetes Paternal [de-identified]     High Cholesterol Sister     Hypertension Sister     Uterine Cancer Neg Hx     Colon Cancer Neg Hx     Ovarian Cancer Neg Hx         Review of Systems - History obtained from the patient  Constitutional: negative for weight loss, fever, night sweats  HEENT: negative for hearing loss, earache, congestion, snoring, sorethroat  CV: negative for chest pain, palpitations, edema  Resp: negative for cough, shortness of breath, wheezing  GI: negative for change in bowel habits, abdominal pain, black or bloody stools  : negative for frequency, dysuria, hematuria, vaginal discharge  MSK: negative for back pain, joint pain, muscle pain  Breast: negative for breast lumps, nipple discharge, galactorrhea  Skin :negative for itching, rash, hives  Neuro: negative for dizziness, headache, confusion, weakness  Psych: negative for anxiety, depression, change in mood  Heme/lymph: negative for bleeding, bruising, pallor    Physical Exam    Visit Vitals  /60   Wt 126 lb (57.2 kg)   LMP 03/27/2022   BMI 20.97 kg/m²       Constitutional  · Appearance: well-nourished, well developed, alert, in no acute distress    HENT  · Head and Face: appears normal    Neck  · Inspection/Palpation: normal appearance, no masses or tenderness  · Lymph Nodes: no lymphadenopathy present  · Thyroid: gland size normal, nontender, no nodules or masses present on palpation    Chest  · Respiratory Effort: breathing unlabored  · Auscultation: normal breath sounds    Cardiovascular  · Heart:  · Auscultation: regular rate and rhythm without murmur    Breasts  · See below    Gastrointestinal  · Abdominal Examination: abdomen non-tender to palpation, normal bowel sounds, no masses present  · Liver and spleen: no hepatomegaly present, spleen not palpable  · Hernias: no hernias identified    Genitourinary  · External Genitalia: normal appearance for age, no discharge present, no tenderness present, no inflammatory lesions present, no masses present, no atrophy present  · Vagina: normal vaginal vault without central or paravaginal defects, no discharge present, no inflammatory lesions present, no masses present  · Bladder: non-tender to palpation  · Urethra: appears normal  · Cervix: see below   · Uterus: normal size, shape and consistency  · Adnexa: no adnexal tenderness present, no adnexal masses present  · Perineum: perineum within normal limits, no evidence of trauma, no rashes or skin lesions present  · Anus: anus within normal limits, no hemorrhoids present  · Inguinal Lymph Nodes: no lymphadenopathy present    Skin  · General Inspection: no rash, no lesions identified    Neurologic/Psychiatric  · Mental Status:  · Orientation: grossly oriented to person, place and time  · Mood and Affect: mood normal, affect appropriate    Assessment:  Routine gynecologic examination  Her current medical status is satisfactory with no evidence of significant gynecologic issues.     Plan:  Counseled re: diet, exercise, healthy lifestyle  Return for yearly wellness visits  Rec annual mammogram             Abnormal bleeding note      Vandominik Ho is a 45 y.o. female who complains of vaginal bleeding problems. Pt states she has had 3 cycle within the past month. She states that before her cycle started on 3/27/2022 she has not had a period for several months, but not a full year without a cycle. She then had a cycle on 3/27/2022 and they last about 4-5 days and goes away and then 2 weeks with no bleeding and then she will start bleeding again and that has happened twice. Pt does not know the exact dates of when she starts bleeding. Her current method of family planning is none. She developed this problem approximately 2 months ago. She has had vaginal bleeding which she describes as moderate lasting up to 5 days. Pad or tampon count: changes every 3 hours. Associated symptoms include none. Alleviating factors: none    Aggravating factors: none      The patient is sexually active. Last Pap smear:was normal.    Her relevant past medical history:   Past Medical History:   Diagnosis Date    AR (allergic rhinitis)     Genital herpes     x- has, not patient    Infertility, female     unexplained    Preeclampsia 7/2015    Severe; with fetal demise at 33 weeks. Past Surgical History:   Procedure Laterality Date    HX APPENDECTOMY  1/2010    HX OTHER SURGICAL  1990    Exp Lap     Social History     Occupational History    Occupation: healthcare manager   Tobacco Use    Smoking status: Never Smoker    Smokeless tobacco: Never Used   Substance and Sexual Activity    Alcohol use:  Yes     Alcohol/week: 0.0 standard drinks     Comment: two drinks a week per patient    Drug use: No    Sexual activity: Yes     Partners: Male     Birth control/protection: None     Family History   Problem Relation Age of Onset    Osteoporosis Mother     Heart Disease Mother     Cancer Mother     Diabetes Paternal Aunt     High Cholesterol Sister     Hypertension Sister     Uterine Cancer Neg Hx     Colon Cancer Neg Hx     Ovarian Cancer Neg Hx        Allergies   Allergen Reactions    Flagyl [Metronidazole] Hives     Prior to Admission medications    Medication Sig Start Date End Date Taking? Authorizing Provider   magnesium 250 mg tab Take  by mouth. Provider, Historical   cholecalciferol (VITAMIN D3) 1,000 unit tablet Take  by mouth daily. Provider, Historical   ALPRAZolam (XANAX) 0.25 mg tablet Take  by mouth. Provider, Historical   cetirizine (ZYRTEC) 10 mg tablet Take 10 mg by mouth. Provider, Historical   sertraline (ZOLOFT) 25 mg tablet TAKE 1 TABLET BY MOUTH DAILY. 1/28/17   Josr Chu MD   valACYclovir (VALTREX) 500 mg tablet Take 1 Tab by mouth daily. 9/23/16   Wayne Whittington MD   loratadine (CLARITIN) 10 mg tablet Take 10 mg by mouth.     Provider, Historical        Review of Systems - History obtained from the patient  Constitutional: negative for weight loss, fever, night sweats  HEENT: negative for hearing loss, earache, congestion, snoring, sorethroat  CV: negative for chest pain, palpitations, edema  Resp: negative for cough, shortness of breath, wheezing  Breast: negative for breast lumps, nipple discharge, galactorrhea  GI: negative for change in bowel habits, abdominal pain, black or bloody stools  : negative for frequency, dysuria, hematuria  MSK: negative for back pain, joint pain, muscle pain  Skin: negative for itching, rash, hives  Neuro: negative for dizziness, headache, confusion, weakness  Psych: negative for anxiety, depression, change in mood  Heme/lymph: negative for bleeding, bruising, pallor      Objective:    Visit Vitals  /60   Wt 126 lb (57.2 kg)   LMP 03/27/2022   BMI 20.97 kg/m²          PHYSICAL EXAMINATION    Breasts  · Inspection of Breasts: breasts symmetrical, no skin changes, no discharge present, nipple appearance normal, no skin retraction present  · Palpation of Breasts and Axillae: moderated fibrocystic density upper quadrants bilaterally, otherwise no masses present on palpation, no breast tenderness  · Axillary Lymph Nodes: no lymphadenopathy present    Genitourinary  · Cervix: endocervical polyp/s removed with cervical biopsy instrument and base treated with silver nitrate       Assessment:   Cervical polyp bleeding likely cause of \"post-menopausal bleeding\"  Fibrocystice changes upper quadrants bilaterally. Plan:   Call if continued or recurrent bleeding. Rx Vit E prn. Instructions given to pt. Handouts given to pt.

## 2022-05-12 LAB
CYTOLOGIST CVX/VAG CYTO: NORMAL
CYTOLOGY CVX/VAG DOC CYTO: NORMAL
CYTOLOGY CVX/VAG DOC THIN PREP: NORMAL
CYTOLOGY HISTORY:: NORMAL
DX ICD CODE: NORMAL
HPV I/H RISK 4 DNA CVX QL PROBE+SIG AMP: NEGATIVE
Lab: NORMAL
OTHER STN SPEC: NORMAL
PATHOLOGIST CVX/VAG CYTO: NORMAL
STAT OF ADQ CVX/VAG CYTO-IMP: NORMAL

## 2022-06-06 ENCOUNTER — PATIENT MESSAGE (OUTPATIENT)
Dept: OBGYN CLINIC | Age: 52
End: 2022-06-06

## 2022-06-06 NOTE — TELEPHONE ENCOUNTER
From: Maritza Michaud  To: Soila Villasenor MD  Sent: 6/6/2022 8:37 AM EDT  Subject: Follow up to polyp removal     Hello I started bleeding again yesterday and also started having daily migraines with aura after a hiatus since you cauterized the polyp. Should I assume that this is normal menopause? The migraines are fairly debilitating and I cannot live on ibuprofen forever. Not sure what to do anymore for this.    Thanks,  Alo

## 2024-05-15 NOTE — PROGRESS NOTES
Bessie Diana is a 53 y.o. female returns for an annual exam     No chief complaint on file.      No LMP recorded.  Her periods are absent in flow   Problems: no problems  Birth Control: post menopausal status.  Last Pap: normal obtained 2 year(s) ago.  She does not have a history of MOOSE 2, 3 or cervical cancer.   Last Mammogram: had her mammogram today in our office.         1. Have you been to the ER, urgent care clinic, or hospitalized since your last visit? No    2. Have you seen or consulted any other health care providers outside of the [facility] since your last visit? No    Examination chaperoned by Cecy Nagel LPN.

## 2024-05-16 ENCOUNTER — OFFICE VISIT (OUTPATIENT)
Age: 54
End: 2024-05-16
Payer: COMMERCIAL

## 2024-05-16 VITALS — BODY MASS INDEX: 20.97 KG/M2 | DIASTOLIC BLOOD PRESSURE: 66 MMHG | HEIGHT: 65 IN | SYSTOLIC BLOOD PRESSURE: 98 MMHG

## 2024-05-16 DIAGNOSIS — Z01.419 ENCOUNTER FOR ANNUAL ROUTINE GYNECOLOGICAL EXAMINATION: Primary | ICD-10-CM

## 2024-05-16 PROCEDURE — 99396 PREV VISIT EST AGE 40-64: CPT | Performed by: OBSTETRICS & GYNECOLOGY

## 2024-05-16 RX ORDER — ESCITALOPRAM OXALATE 5 MG/1
5 TABLET ORAL
COMMUNITY
Start: 2024-05-05 | End: 2024-08-03

## 2024-05-16 SDOH — ECONOMIC STABILITY: HOUSING INSECURITY
IN THE LAST 12 MONTHS, WAS THERE A TIME WHEN YOU DID NOT HAVE A STEADY PLACE TO SLEEP OR SLEPT IN A SHELTER (INCLUDING NOW)?: NO

## 2024-05-16 SDOH — ECONOMIC STABILITY: FOOD INSECURITY: WITHIN THE PAST 12 MONTHS, YOU WORRIED THAT YOUR FOOD WOULD RUN OUT BEFORE YOU GOT MONEY TO BUY MORE.: NEVER TRUE

## 2024-05-16 SDOH — ECONOMIC STABILITY: FOOD INSECURITY: WITHIN THE PAST 12 MONTHS, THE FOOD YOU BOUGHT JUST DIDN'T LAST AND YOU DIDN'T HAVE MONEY TO GET MORE.: NEVER TRUE

## 2024-05-16 SDOH — ECONOMIC STABILITY: INCOME INSECURITY: HOW HARD IS IT FOR YOU TO PAY FOR THE VERY BASICS LIKE FOOD, HOUSING, MEDICAL CARE, AND HEATING?: NOT HARD AT ALL

## 2024-05-16 ASSESSMENT — PATIENT HEALTH QUESTIONNAIRE - PHQ9
SUM OF ALL RESPONSES TO PHQ QUESTIONS 1-9: 0
2. FEELING DOWN, DEPRESSED OR HOPELESS: NOT AT ALL
SUM OF ALL RESPONSES TO PHQ QUESTIONS 1-9: 0
SUM OF ALL RESPONSES TO PHQ9 QUESTIONS 1 & 2: 0
SUM OF ALL RESPONSES TO PHQ QUESTIONS 1-9: 0
SUM OF ALL RESPONSES TO PHQ QUESTIONS 1-9: 0
1. LITTLE INTEREST OR PLEASURE IN DOING THINGS: NOT AT ALL

## 2024-05-16 NOTE — PROGRESS NOTES
Annual exam post menopause      Bessie Diana is a ,  53 y.o. female   No LMP recorded. (Menstrual status: Menopause).    She presents for her annual checkup.     She is having no problems.    Menstrual status:  The patient is not having menses.    Hormonal status:  She reports no perimenstrual type symptoms.   She is not having vasomotor symptoms.  The patient is not using any HRT.     Sexual history:    She  reports being sexually active and has had partner(s) who are male. She reports using the following method of birth control/protection: None.    Per Nursing Note:  Last Pap: normal obtained 2 year(s) ago.  She does not have a history of MOOSE 2, 3 or cervical cancer.   Last Mammogram: had her mammogram today in our office    Past Medical History:   Diagnosis Date    AR (allergic rhinitis)     Genital herpes     x- has, not patient    Infertility, female     unexplained    Preeclampsia 2015    Severe; with fetal demise at 29 weeks.     Past Surgical History:   Procedure Laterality Date    APPENDECTOMY  2010    OTHER SURGICAL HISTORY      Exp Lap       Current Outpatient Medications   Medication Sig Dispense Refill    escitalopram (LEXAPRO) 5 MG tablet Take 1 tablet by mouth      vitamin D (CHOLECALCIFEROL) 25 MCG (1000 UT) TABS tablet Take by mouth daily      loratadine (CLARITIN) 10 MG tablet Take 1 tablet by mouth      ALPRAZolam (XANAX) 0.25 MG tablet Take by mouth. (Patient not taking: Reported on 2024)       No current facility-administered medications for this visit.     Allergies: Metronidazole     Tobacco History:  reports that she has never smoked. She has never used smokeless tobacco.  Alcohol Abuse:  reports current alcohol use.  Drug Abuse:  reports no history of drug use.    Family Medical/Cancer History:   Family History   Problem Relation Age of Onset    Ovarian Cancer Neg Hx     Colon Cancer Neg Hx     Uterine Cancer Neg Hx     Hypertension Sister     Osteoporosis